# Patient Record
Sex: FEMALE | Race: WHITE | Employment: OTHER | URBAN - METROPOLITAN AREA
[De-identification: names, ages, dates, MRNs, and addresses within clinical notes are randomized per-mention and may not be internally consistent; named-entity substitution may affect disease eponyms.]

---

## 2020-08-19 ENCOUNTER — HOSPITAL ENCOUNTER (EMERGENCY)
Age: 85
Discharge: HOME | End: 2020-08-19
Attending: EMERGENCY MEDICINE
Payer: MEDICARE

## 2020-08-19 ENCOUNTER — APPOINTMENT (OUTPATIENT)
Dept: GENERAL RADIOLOGY | Age: 85
End: 2020-08-19
Attending: PHYSICIAN ASSISTANT
Payer: MEDICARE

## 2020-08-19 ENCOUNTER — HOSPITAL ENCOUNTER (EMERGENCY)
Dept: CT IMAGING | Age: 85
Discharge: HOME | End: 2020-08-19
Attending: PHYSICIAN ASSISTANT
Payer: MEDICARE

## 2020-08-19 VITALS
OXYGEN SATURATION: 98 % | SYSTOLIC BLOOD PRESSURE: 138 MMHG | RESPIRATION RATE: 17 BRPM | TEMPERATURE: 98.6 F | WEIGHT: 140 LBS | DIASTOLIC BLOOD PRESSURE: 54 MMHG | HEART RATE: 68 BPM

## 2020-08-19 DIAGNOSIS — S42.351A CLOSED DISPLACED COMMINUTED FRACTURE OF SHAFT OF RIGHT HUMERUS, INITIAL ENCOUNTER: Primary | ICD-10-CM
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
## 2020-08-19 DIAGNOSIS — S00.33XA CONTUSION OF NOSE, INITIAL ENCOUNTER: ICD-10-CM

## 2020-08-19 DIAGNOSIS — S09.90XA CLOSED HEAD INJURY, INITIAL ENCOUNTER: ICD-10-CM

## 2020-08-19 PROCEDURE — 96376 TX/PRO/DX INJ SAME DRUG ADON: CPT

## 2020-08-19 PROCEDURE — 75810000053 HC SPLINT APPLICATION

## 2020-08-19 PROCEDURE — 70486 CT MAXILLOFACIAL W/O DYE: CPT

## 2020-08-19 PROCEDURE — 74011250636 HC RX REV CODE- 250/636: Performed by: PHYSICIAN ASSISTANT

## 2020-08-19 PROCEDURE — 73060 X-RAY EXAM OF HUMERUS: CPT

## 2020-08-19 PROCEDURE — 96374 THER/PROPH/DIAG INJ IV PUSH: CPT

## 2020-08-19 PROCEDURE — 70450 CT HEAD/BRAIN W/O DYE: CPT

## 2020-08-19 PROCEDURE — 99285 EMERGENCY DEPT VISIT HI MDM: CPT

## 2020-08-19 PROCEDURE — 96375 TX/PRO/DX INJ NEW DRUG ADDON: CPT

## 2020-08-19 RX ORDER — FENTANYL CITRATE 50 UG/ML
50 INJECTION, SOLUTION INTRAMUSCULAR; INTRAVENOUS
Status: COMPLETED | OUTPATIENT
Start: 2020-08-19 | End: 2020-08-19

## 2020-08-19 RX ORDER — ONDANSETRON 2 MG/ML
4 INJECTION INTRAMUSCULAR; INTRAVENOUS
Status: COMPLETED | OUTPATIENT
Start: 2020-08-19 | End: 2020-08-19

## 2020-08-19 RX ORDER — OXYCODONE AND ACETAMINOPHEN 5; 325 MG/1; MG/1
1 TABLET ORAL
Qty: 16 TAB | Refills: 0 | Status: SHIPPED | OUTPATIENT
Start: 2020-08-19 | End: 2020-08-23

## 2020-08-19 RX ADMIN — ONDANSETRON 4 MG: 2 INJECTION INTRAMUSCULAR; INTRAVENOUS at 13:49

## 2020-08-19 RX ADMIN — FENTANYL CITRATE 50 MCG: 50 INJECTION, SOLUTION INTRAMUSCULAR; INTRAVENOUS at 15:38

## 2020-08-19 RX ADMIN — FENTANYL CITRATE 50 MCG: 50 INJECTION, SOLUTION INTRAMUSCULAR; INTRAVENOUS at 13:49

## 2020-08-19 NOTE — ED PROVIDER NOTES
EMERGENCY DEPARTMENT HISTORY AND PHYSICAL EXAM    Date: 8/19/2020  Patient Name: Lyndon Membreno    History of Presenting Illness     Chief Complaint   Patient presents with    Arm Injury         History Provided By: Patient    1:47 PM  Lyndon Membreno is a 80 y.o. female who presents to the emergency department via EMS c/O right arm pain and facial injury. Patient states just prior to arrival she was stepping over the gas pump line while putting gas in her car and tripped and fell down, injuring her right arm and hitting her face on the ground. She denies LOC, a bystander helped her up and EMS was called. Pt denies neck pain, vision changes, epistaxis, dental injury, back pain, chest pain, shortness of breath, abdominal pain, leg pain, and any other sxs or complaints. PCP: Marely Oneill MD    Current Outpatient Medications   Medication Sig Dispense Refill    oxyCODONE-acetaminophen (Percocet) 5-325 mg per tablet Take 1 Tab by mouth every six (6) hours as needed for Pain for up to 4 days. Max Daily Amount: 4 Tabs. 16 Tab 0    docusate sodium (Colace Clear) 50 mg capsule Take 1 Cap by mouth two (2) times a day for 14 days. 28 Cap 0       Past History     Past Medical History:  History reviewed. No pertinent past medical history. Past Surgical History:  History reviewed. No pertinent surgical history. Family History:  History reviewed. No pertinent family history. Social History:  Social History     Tobacco Use    Smoking status: Former Smoker    Smokeless tobacco: Never Used   Substance Use Topics    Alcohol use: Not on file    Drug use: Not on file       Allergies: Allergies   Allergen Reactions    Sulfa (Sulfonamide Antibiotics) Not Reported This Time         Review of Systems   Review of Systems   HENT: Positive for facial swelling. Respiratory: Negative for shortness of breath. Cardiovascular: Negative for chest pain. Musculoskeletal: Positive for arthralgias and myalgias.    Neurological: Positive for headaches. Negative for syncope. All other systems reviewed and are negative. Physical Exam     Vitals:    08/19/20 1500 08/19/20 1529 08/19/20 1600 08/19/20 1711   BP: 128/44 136/45 107/84 138/54   Pulse:  64 67 68   Resp:       Temp:       SpO2: 96% 94%  98%   Weight:         Physical Exam    Vital signs and nursing notes reviewed. CONSTITUTIONAL: Alert. Well-appearing; well-nourished; elderly female, in no apparent distress. HEAD: Normocephalic; contusion with swelling bridge of nose and right forehead just above the eyebrow. Mildly tender. No bony depression or laceration. Negative singer sign. No raccoon eyes. EYES: PERRL; EOM's intact. No nystagmus. Conjunctiva clear. ENT: TM's normal. External ear normal.  Bridge of nose is swollen and tender as noted above. No epistaxis or septal hematoma.; no rhinorrhea. Normal pharynx. No dental injury; moist mucus membranes. NECK: Supple; FROM without difficulty or pain, non-tender; no cervical lymphadenopathy. CV: Normal S1, S2; no murmurs, rubs, or gallops. No chest wall tenderness. RESPIRATORY: Normal chest excursion with respiration; breath sounds clear and equal bilaterally; no wheezes, rhonchi, or rales. GI: Normal bowel sounds; non-distended; non-tender; no guarding or rigidity; no palpable organomegaly. No CVA tenderness. BACK:  No evidence of trauma or deformity. Non-tender to palpation. EXT: Normal ROM in in both lower extremities without pain. Very superficial abrasion over left patella, nontender without bony deformity. Right upper extremity diffusely tender and swollen over the humerus. Elbow forearm wrist and hand nontender/atraumatic, sensation intact, 2+ radial pulse, moves all fingers. Left upper extremity nontender/full range of motion without pain. SKIN: Normal for age and race; warm; dry; good turgor; no apparent lesions or exudate. NEURO: A & O x3. Cranial nerves 2-12 intact.  Motor 5/5 bilaterally. Sensation intact. PSYCH:  Mood and affect appropriate. Diagnostic Study Results     Labs -   No results found for this or any previous visit (from the past 12 hour(s)). Radiologic Studies -   CT HEAD WO CONT   Final Result   IMPRESSION:         1. No acute intracranial abnormality demonstrated. 2. Small right frontal scalp hematoma. 3. Subcortical and periventricular white matter low-attenuation compatible with   sequela of chronic ischemic microvascular change. 4. Paranasal mucosal thickening as above. CT MAXILLOFACIAL WO CONT   Final Result   IMPRESSION:         1. Small right frontal scalp hematoma. No evidence of maxillofacial fracture. 2. Advanced bilateral temporomandibular joint osteoarthritis. XR HUMERUS RT   Final Result   IMPRESSION:      Comminuted fracture of the right humerus midshaft        CT Results  (Last 48 hours)               08/19/20 1456  CT HEAD WO CONT Final result    Impression:  IMPRESSION:           1. No acute intracranial abnormality demonstrated. 2. Small right frontal scalp hematoma. 3. Subcortical and periventricular white matter low-attenuation compatible with   sequela of chronic ischemic microvascular change. 4. Paranasal mucosal thickening as above. Narrative:  EXAM: CT head       INDICATION: Mechanical fall with right-sided periorbital soft tissue swelling. COMPARISON: None. TECHNIQUE: Axial CT imaging of the head was performed without intravenous   contrast. Standard multiplanar coronal and sagittal reformatted images were   obtained and are included in interpretation. One or more dose reduction techniques were used on this CT: automated exposure   control, adjustment of the mAs and/or kVp according to patient size, and   iterative reconstruction techniques. The specific techniques used on this CT   exam have been documented in the patient's electronic medical record.   Digital   Imaging and Communications in Medicine (DICOM) format image data are available   to nonaffiliated external healthcare facilities or entities on a secure, media   free, reciprocally searchable basis with patient authorization for at least a   12-month period after this study. _______________       FINDINGS:       BRAIN AND POSTERIOR FOSSA: The sulci, folia, ventricles and basal cisterns are   within normal limits for the patient?s age. No intra-axial hemorrhage. No mass   effect or midline shift. Subcortical and periventricular white matter   low-attenuation present. There are no areas of abnormal parenchymal attenuation. EXTRA-AXIAL SPACES AND MENINGES: There are no abnormal extra-axial fluid   collections. CALVARIUM: Intact. SINUSES: Mucosal thickening of the left frontal sinus, left-sided ethmoid air   cells, and maxillary sinus. Mastoid air cells are well pneumatized. OTHER: Small right frontal scalp hematoma.       _______________           08/19/20 1456  CT MAXILLOFACIAL WO CONT Final result    Impression:  IMPRESSION:           1. Small right frontal scalp hematoma. No evidence of maxillofacial fracture. 2. Advanced bilateral temporomandibular joint osteoarthritis. Narrative:  Maxillofacial CT       History: Maxillofacial trauma with right forehead contusion       Technique: Multiple axial CT images of the maxillofacial structures including   the mandible were performed. Additional coronal and sagittal reformations were   also performed. One or more dose reduction techniques were used on this CT:   automated exposure control, adjustment of the mAs and/or kVp according to   patient size, and iterative reconstruction techniques. The specific techniques   used on this CT exam have been documented in the patient's electronic medical   record.   Digital Imaging and Communications in Medicine (DICOM) format image   data are available to nonaffiliated external healthcare facilities or entities   on a secure, media free, reciprocally searchable basis with patient   authorization for at least a 12-month period after this study. Findings: The frontal bone, lateral orbital walls, maxillary sinus walls, zygomatic   arches, and medial orbital walls appear intact. Nasal bones and anterior   maxillary spine are both intact. Pterygoid plates appear within normal limits. Mandible is intact. Temporomandibular joint alignment is within normal limits. Advanced bilateral temporomandibular joint osteoarthritis is present. Small right frontal scalp hematoma. Bilateral ocular lenses are surgically   replaced. CT appearance of the globes is within normal limits. No acute intracranial abnormality demonstrated. Paranasal sinus opacification as noted on contemporaneously performed head CT   involving the left frontal, left-sided ethmoid, and left maxillary sinuses. No               CXR Results  (Last 48 hours)    None          Medications given in the ED-  Medications   fentaNYL citrate (PF) injection 50 mcg (50 mcg IntraVENous Given 8/19/20 1349)   ondansetron (ZOFRAN) injection 4 mg (4 mg IntraVENous Given 8/19/20 1349)   fentaNYL citrate (PF) injection 50 mcg (50 mcg IntraVENous Given 8/19/20 1538)         Medical Decision Making   I am the first provider for this patient. I reviewed the vital signs, available nursing notes, past medical history, past surgical history, family history and social history. Vital Signs-Reviewed the patient's vital signs. Records Reviewed: Nursing Notes      Procedures:  Procedures    ED Course:  1:47 PM   Initial assessment performed. The patients presenting problems have been discussed, and they are in agreement with the care plan formulated and outlined with them. I have encouraged them to ask questions as they arise throughout their visit. [phone] PM consult note  Case discussed with orthopedist on to call Dr. Zakia Mcarthur who reviewed x-ray.   He recommends OrthoGlass coaptation splint to right arm and follow up in his office as outpatient. Procedure Note - Splint Assessment:  5:30 PM  Performed by: ED danay  Splint to patient's right arm - right was evaluated by KT Carvajal. Splint in good position. N/V intact after treatment. The procedure took 1-15 minutes, and patient tolerated well. Provider Notes (Medical Decision Making): Lyndon Membreno is a 80 y.o. female who presents with facial injury and right arm pain status post mechanical fall at gas station just prior to arrival.  Exam reveals contusions to her face but negative CT of the head and maxillofacial.  X-ray of the right humerus shows a comminuted displaced midshaft humerus fracture but neurovascular intact before and after splinting. Ortho consulted and recommended coaptation splint with sling and outpatient follow-up. Pain controlled in ED with fentanyl with prescription for Percocet and close follow-up with Ortho. Diagnosis and Disposition       DISCHARGE NOTE:    Julia Luque's  results have been reviewed with her. She has been counseled regarding her diagnosis, treatment, and plan. She verbally conveys understanding and agreement of the signs, symptoms, diagnosis, treatment and prognosis and additionally agrees to follow up as discussed. She also agrees with the care-plan and conveys that all of her questions have been answered. I have also provided discharge instructions for her that include: educational information regarding their diagnosis and treatment, and list of reasons why they would want to return to the ED prior to their follow-up appointment, should her condition change. She has been provided with education for proper emergency department utilization. CLINICAL IMPRESSION:    1. Closed displaced comminuted fracture of shaft of right humerus, initial encounter    2. Closed head injury, initial encounter    3. Contusion of nose, initial encounter    4.  Fall from slip, trip, or stumble, initial encounter        PLAN:  1. D/C Home  2. Discharge Medication List as of 8/19/2020  3:49 PM      START taking these medications    Details   oxyCODONE-acetaminophen (Percocet) 5-325 mg per tablet Take 1 Tab by mouth every six (6) hours as needed for Pain for up to 4 days. Max Daily Amount: 4 Tabs., Normal, Disp-16 Tab,R-0      docusate sodium (Colace Clear) 50 mg capsule Take 1 Cap by mouth two (2) times a day for 14 days. , Normal, Disp-28 Cap,R-0           3. Follow-up Information     Follow up With Specialties Details Why Contact Info    Mei Purvis MD Orthopedic Surgery Schedule an appointment as soon as possible for a visit  [address] [place] [address]  [phone]      [facility] EMERGENCY DEPT Emergency Medicine  As needed, If symptoms worsen [address] [id number]  [phone]        _______________________________      Please note that this dictation was completed with [employer], the computer voice recognition software. Quite often unanticipated grammatical, syntax, homophones, and other interpretive errors are inadvertently transcribed by the computer software. Please disregard these errors. Please excuse any errors that have escaped final proofreading.

## 2020-08-19 NOTE — ED TRIAGE NOTES
Patient arrives via EMS from [facility] [place] d/t fall. Patient arrives with +deformity to RUE; applied splint noted via EMS. Patient reports tripped over gas line while getting gas. Patient hit head; denies LOC.  Noted abrasion to nose